# Patient Record
(demographics unavailable — no encounter records)

---

## 2024-10-14 NOTE — RESULTS/DATA
Less than 6 months (influenza vaccine not applicable for this age group) [FreeTextEntry1] : Smear reveals 3+ microcytosis and hypochromia.  Pencil forms are present.  No basophilic stippling seen. Platelets appear copious. No abnormal wbc morphology

## 2024-10-14 NOTE — HISTORY OF PRESENT ILLNESS
[de-identified] : In a state of previous very good health, Nataly was found to have a hemoglobin of 9.3 g/dL at an annual visit.  She reports no dizziness, syncope, headaches or pagophagia.  She plays basketball and does track and field and does report some loss of power and endurance.  She has a monthly blood loss of 5 days with at lest one day of the five with 4-5 ppd. Her diet is not heavy in meat or iron rich vegetables.  She reports no other blood loss.

## 2024-10-14 NOTE — CONSULT LETTER
[Dear  ___] : Dear  [unfilled], [Consult Letter:] : I had the pleasure of evaluating your patient, [unfilled]. [Please see my note below.] : Please see my note below. [Consult Closing:] : Thank you very much for allowing me to participate in the care of this patient.  If you have any questions, please do not hesitate to contact me. [FreeTextEntry3] : Dakota Carter MD [place] Chief of Operations Division of Pediatric Hematology Oncology [facility] Professor of Pediatrics [facility]  [employer] [facility]

## 2025-06-11 NOTE — HISTORY OF PRESENT ILLNESS
No pertinent past medical history [FreeTextEntry1] : Pediatric & Adolescent Gynecology: New Patient Visit   NATALY is a(n) 17 year old female ( 2008) presenting for ERI.    Referred by: PEGGY SPIVEY MD PCP: RONNIE CONNOR   Review of history from records: Seen by Hematology 10/14/24 Nataly was found to have a hemoglobin of 9.3 g/dL at an annual visit At visit: HGB: 8.6 g/dL -> planned for IV Iron 24: 10.7 g/dL  Today 2025: pt is here with   Menstrual history: Menarche? Cycles: *regular Duration of periods: Flow: Period products: Cramps: - takes LMP?? Prior periods:       * to be used if the visit is addressing menstrual periods / hormonal medication Bleeding history: -No personal history of frequent/prolonged nosebleeds, easy bruising, excess bleeding with injury. -Has never had surgery (or) Had surgery w/o bleeding problems: -No known family history of bleeding disorders. Estrogen contraindications: -No personal history of DVT/PE/clotting disorder, migraines w/aura, HTN, DM, dyslipidemia, CVD, renal or liver disease, IBD, SLE w/ APLA, malignancy. -No close family history of DVT/PE/clotting disorder.      Social / Confidential history: obtained? Lives with:? - feels safe - no h/o abuse School: * grade - after HS: Activities/Hobbies: - Mood: generally good - no h/o depression/anxiety Dating/Relationships: - current: - past: Interested in?[boys/girls/both/not sure]: - Sexual activity: - pre-sexual activity: Gender/Pronouns: